# Patient Record
Sex: FEMALE
[De-identification: names, ages, dates, MRNs, and addresses within clinical notes are randomized per-mention and may not be internally consistent; named-entity substitution may affect disease eponyms.]

---

## 2019-10-17 ENCOUNTER — APPOINTMENT (OUTPATIENT)
Dept: OTOLARYNGOLOGY | Facility: CLINIC | Age: 39
End: 2019-10-17
Payer: COMMERCIAL

## 2019-10-17 VITALS
HEART RATE: 101 BPM | HEIGHT: 60 IN | DIASTOLIC BLOOD PRESSURE: 95 MMHG | SYSTOLIC BLOOD PRESSURE: 127 MMHG | WEIGHT: 145 LBS | BODY MASS INDEX: 28.47 KG/M2

## 2019-10-17 DIAGNOSIS — Z78.9 OTHER SPECIFIED HEALTH STATUS: ICD-10-CM

## 2019-10-17 DIAGNOSIS — Z87.891 PERSONAL HISTORY OF NICOTINE DEPENDENCE: ICD-10-CM

## 2019-10-17 PROBLEM — Z00.00 ENCOUNTER FOR PREVENTIVE HEALTH EXAMINATION: Status: ACTIVE | Noted: 2019-10-17

## 2019-10-17 PROCEDURE — 31575 DIAGNOSTIC LARYNGOSCOPY: CPT

## 2019-10-17 PROCEDURE — 99203 OFFICE O/P NEW LOW 30 MIN: CPT | Mod: 25

## 2019-10-17 RX ORDER — VALACYCLOVIR 500 MG/1
500 TABLET, FILM COATED ORAL
Qty: 30 | Refills: 0 | Status: ACTIVE | COMMUNITY
Start: 2019-09-19

## 2019-10-17 RX ORDER — CHORIOGONADOTROPIN ALFA 250 UG/.5ML
250 INJECTION, SOLUTION SUBCUTANEOUS
Qty: 1 | Refills: 0 | Status: ACTIVE | COMMUNITY
Start: 2019-08-21

## 2019-10-17 RX ORDER — CLOMIPHENE CITRATE 50 MG/1
50 TABLET ORAL
Qty: 10 | Refills: 0 | Status: ACTIVE | COMMUNITY
Start: 2019-10-07

## 2019-10-17 RX ORDER — DOXYCYCLINE HYCLATE 100 MG/1
100 TABLET ORAL
Qty: 14 | Refills: 0 | Status: COMPLETED | COMMUNITY
Start: 2019-07-18

## 2019-10-17 RX ORDER — TOBRAMYCIN AND DEXAMETHASONE 3; 1 MG/ML; MG/ML
0.3-0.1 SUSPENSION/ DROPS OPHTHALMIC
Qty: 10 | Refills: 0 | Status: COMPLETED | COMMUNITY
Start: 2019-06-19

## 2019-10-17 NOTE — REASON FOR VISIT
[Neck Mass] : neck mass [Initial Evaluation] : an initial evaluation for [Salivary Gland Problem] : salivary gland problem

## 2019-10-17 NOTE — ASSESSMENT
[FreeTextEntry1] : She has a neck mass which is due to acute left submandibular gland sialadenitis. There was no cellulitis. I was able to express some purulent drainage from the duct on palpation.  There is no floor of mouth swelling  and her airway is patent\par \par Plan\par -Findings and management options were discussed with the patient.\par -Warm compresses\par -Hydration and sialagogues\par -She was asked to massage the gland\par -I am placing her on a course of Augmentin. She will check with her obstetrician to ensure it is okay with her IVF treatment\par -I am sending her for a CT scan without contrast to rule out a submandibular gland stone or lesion. She will also speak with her obstetrician to ensure it is okay\par -I am going to see her back next week. I told her to call me and/or go to the emergency room if she has any worsening symptoms or concerns

## 2019-10-17 NOTE — HISTORY OF PRESENT ILLNESS
[de-identified] : OLVIN SAUER is a 39 year patient With a swollen gland in the left neck since Monday. It gets more swollen after she eats. She was not sick when it started. She had something similar about 1-2 months ago which resolved. She had had other episodes. At least one improved with antibiotics. She has no dysphagia, throat pain, voice change, or bleeding. She has no night sweats, fevers, or unexpected weight loss. There is no family history or personal history of leukemia or lymphoma. She is undergoing an IVF treatment. She was last seen here in 2008.

## 2019-10-21 ENCOUNTER — APPOINTMENT (OUTPATIENT)
Dept: OTOLARYNGOLOGY | Facility: CLINIC | Age: 39
End: 2019-10-21
Payer: COMMERCIAL

## 2019-10-21 PROCEDURE — 99213 OFFICE O/P EST LOW 20 MIN: CPT

## 2019-10-21 NOTE — ASSESSMENT
[FreeTextEntry1] : She has left submandibular gland sialoadenitis.  She initially had improvement on augmentin but still has inflammation of the gland. There was no cellulitis.  There is drainage from the submandibular gland duct. \par \par Plan\par -Findings and management options were discussed with the patient.\par -continue warm compresses, hydration and sialagogues\par -continue to massage the gland\par -I would like to switch her to clindamycin and add a burst of prednisone.  I reviewed some of the risks of prednisone which include GI complications, mood changes and bone issues/fractures. She is going to speak to her fertility doctor to see if the medications are okay for her to take. \par -CT scan is pending. I will see if it can be done today. \par -she is going to call me after she speaks with her doctor.  My office will also call her regarding the CT scan. She will keep her appointment for Thursday.  She will return earlier if any concerns. She should call me and possible go to the ER if any problems.

## 2019-10-21 NOTE — HISTORY OF PRESENT ILLNESS
[de-identified] : OLVIN SAUER is a 39 year patient With a swollen gland in the left neck since Monday. It gets more swollen after she eats. She was not sick when it started. She had something similar about 1-2 months ago which resolved. She had had other episodes. At least one improved with antibiotics. She has no dysphagia, throat pain, voice change, or bleeding. She has no night sweats, fevers, or unexpected weight loss. There is no family history or personal history of leukemia or lymphoma. She is undergoing an IVF treatment. She was last seen here in 2008. [FreeTextEntry1] : \par 10/21/19- Brisa Sinha is here for follow up for left submandibular gland sialoadenitis.  She has been on Augmentin since Thursday.  She initially thought she was improving but now has not. She has pain and swelling. She has been massaging the gland. there is milky drainage when she does.  She has also tried sialogogues and hydration.  CT scan is pending.

## 2019-10-23 ENCOUNTER — APPOINTMENT (OUTPATIENT)
Dept: CT IMAGING | Facility: CLINIC | Age: 39
End: 2019-10-23

## 2019-10-24 ENCOUNTER — APPOINTMENT (OUTPATIENT)
Dept: OTOLARYNGOLOGY | Facility: CLINIC | Age: 39
End: 2019-10-24
Payer: COMMERCIAL

## 2019-10-24 PROCEDURE — 99213 OFFICE O/P EST LOW 20 MIN: CPT

## 2019-10-24 NOTE — HISTORY OF PRESENT ILLNESS
[de-identified] : I am seeing this woman followup at the request of Dr. sands.\par She's been treating her for a submandibular gland phlegmon/cellulitis.\par She's finally responding to treatment.\par \par She reports that for the last 2 years she is intermittently had very low-grade symptoms related to her left submandibular gland. She reports a day or so low grade they have never required treatment her workup.\par \par From this acute infection she feels almost back to normal.\par \par She imaging which I recently reviewed. This shows an approximately 1 cm stone at the junction of the smg hilum and the duct. She had significant induration of the gland.

## 2019-10-24 NOTE — ASSESSMENT
[FreeTextEntry1] : She has a resolving left-sided acute submandibular gland infection. She will complete her current treatment.\par \par A review of her CT scan with her.\par \par I explained her secondary to the size of the stone in the proximal location that her options were observation versus submandibular gland resection.\par We talked about the risks which include but are not limited to risks of anesthesia, bleeding, infection or transient or permanent injury to her marginal mandibular nerve. I demonstrated what marginal mandibular nerve injury looks like I picture.\par \par She will followup with me in a month.\par I will palpate her floor mouth when there is no inflammation and see if a stone can be palpated.\par I did explain her to could she could consider intraoral resection with marsupialization of the duct.\par I did tell her that still gave her the possibility of stenosis of her duct and a ventral infection. I also explained her the lingual nerve could be at risk in that setting to

## 2019-10-24 NOTE — REASON FOR VISIT
[Subsequent Evaluation] : a subsequent evaluation for [FreeTextEntry2] : submandibular gland infection.

## 2019-11-25 ENCOUNTER — APPOINTMENT (OUTPATIENT)
Dept: OTOLARYNGOLOGY | Facility: CLINIC | Age: 39
End: 2019-11-25

## 2021-06-02 ENCOUNTER — APPOINTMENT (OUTPATIENT)
Dept: OTOLARYNGOLOGY | Facility: CLINIC | Age: 41
End: 2021-06-02
Payer: COMMERCIAL

## 2021-06-02 VITALS — BODY MASS INDEX: 28.47 KG/M2 | TEMPERATURE: 98.6 F | WEIGHT: 145 LBS | HEIGHT: 60 IN

## 2021-06-02 DIAGNOSIS — R22.1 LOCALIZED SWELLING, MASS AND LUMP, NECK: ICD-10-CM

## 2021-06-02 PROCEDURE — 99214 OFFICE O/P EST MOD 30 MIN: CPT | Mod: 25

## 2021-06-02 PROCEDURE — 31575 DIAGNOSTIC LARYNGOSCOPY: CPT

## 2021-06-02 PROCEDURE — 99072 ADDL SUPL MATRL&STAF TM PHE: CPT

## 2021-06-02 RX ORDER — CLINDAMYCIN HYDROCHLORIDE 300 MG/1
300 CAPSULE ORAL 3 TIMES DAILY
Qty: 42 | Refills: 0 | Status: DISCONTINUED | COMMUNITY
Start: 2019-10-21 | End: 2021-06-02

## 2021-06-02 RX ORDER — AMOXICILLIN AND CLAVULANATE POTASSIUM 875; 125 MG/1; MG/1
875-125 TABLET, COATED ORAL
Qty: 14 | Refills: 0 | Status: DISCONTINUED | COMMUNITY
Start: 2019-10-17 | End: 2021-06-02

## 2021-06-02 RX ORDER — PREDNISONE 10 MG/1
10 TABLET ORAL
Qty: 12 | Refills: 0 | Status: DISCONTINUED | COMMUNITY
Start: 2019-10-21 | End: 2021-06-02

## 2021-06-02 NOTE — ASSESSMENT
[FreeTextEntry1] : Acute Left sialadenitis due to sialithiasis.  Recommended with nausea, head of bed elevation, sialagogues, warm compresses. We will prescribed Augmentin, but if needed she tells me her OB improved clindamycin. We will try to avoid steroids, but she will followup in 2 days before the weekend to be sure she is still improving and if not need to discuss use of steroids with her OB/GYN. She understands she should followup sooner if she has any worsening of her symptoms or airway compromise. None was visible on endoscopy today

## 2021-06-02 NOTE — HISTORY OF PRESENT ILLNESS
[de-identified] : Patient with history of left sialadenitis October 2019, originally did not respond to Augmentin, switched to clindamycin and prednisone and resolved. CT scan at that time and showed 1 cm stone proximal and intact. She was offered in office marsupialization and removal of stone versus sialadenectomy by Dr Matias.  She deferred, and was well since then with no flareups. Last night noted the gland "filled up" after eating. She was unable to decompress it in the usual manner of liquid. She is currently in her second trimester of pregnancy. Tells me her OB/GYN approved use of clindamycin

## 2021-06-04 ENCOUNTER — APPOINTMENT (OUTPATIENT)
Dept: OTOLARYNGOLOGY | Facility: CLINIC | Age: 41
End: 2021-06-04
Payer: COMMERCIAL

## 2021-06-04 PROCEDURE — 99072 ADDL SUPL MATRL&STAF TM PHE: CPT

## 2021-06-04 PROCEDURE — 99212 OFFICE O/P EST SF 10 MIN: CPT

## 2021-06-04 NOTE — HISTORY OF PRESENT ILLNESS
[de-identified] : OLVIN SAUER is a 41 year patient here for followup for left sialoadenitis. She saw Dr. Ken on June 2 for a recurrent infection. She has been on Augmentin. She said the swelling has gotten a little bit better. She still has discomfort. She has no difficulty breathing. She has a history of a left submandibular gland sialolithiasis which was diagnosed in October 2019. She opted for observation. She had done well until this recent infection.  she is currently in the second trimester of pregnancy

## 2021-06-04 NOTE — ASSESSMENT
[FreeTextEntry1] : She has acute left sialoadenitis. She has a history of sialolithiasis on that side. She has been on Augmentin. She is slowly improving. I was able to express purulent drainage from the gland\par \par  \par -Plan\par -Findings and management options were discussed with the patient.\par -hydration\par -massage of the gland\par -warm compresses/heating pad as needed. \par -She will continue to take Augmentin. If she does not continue to improve or her symptoms worsen, we will switch her to clindamycin. We would also consider adding steroids\par -If she is unable to keep up her nutrition, I told her to speak with her OB/GYN. She may need hospitalization for intravenous.\par -She was asked to follow up with Dr. Ken or myself next week.\par -She was told to call and go to the emergency room if she has any worsening symptoms over the weekend..

## 2021-06-17 ENCOUNTER — APPOINTMENT (OUTPATIENT)
Dept: OTOLARYNGOLOGY | Facility: CLINIC | Age: 41
End: 2021-06-17
Payer: COMMERCIAL

## 2021-06-17 DIAGNOSIS — K11.21 ACUTE SIALOADENITIS: ICD-10-CM

## 2021-06-17 DIAGNOSIS — K11.5 SIALOLITHIASIS: ICD-10-CM

## 2021-06-17 PROCEDURE — 99214 OFFICE O/P EST MOD 30 MIN: CPT

## 2021-06-17 PROCEDURE — 99072 ADDL SUPL MATRL&STAF TM PHE: CPT

## 2021-06-17 RX ORDER — AMOXICILLIN AND CLAVULANATE POTASSIUM 875; 125 MG/1; MG/1
875-125 TABLET, COATED ORAL
Qty: 20 | Refills: 0 | Status: COMPLETED | COMMUNITY
Start: 2021-06-02 | End: 2021-06-17

## 2021-06-17 RX ORDER — PREDNISONE 10 MG/1
10 TABLET ORAL
Qty: 12 | Refills: 0 | Status: ACTIVE | COMMUNITY
Start: 2021-06-17 | End: 1900-01-01

## 2021-06-17 RX ORDER — CLINDAMYCIN HYDROCHLORIDE 300 MG/1
300 CAPSULE ORAL
Qty: 30 | Refills: 0 | Status: ACTIVE | COMMUNITY
Start: 2021-06-17 | End: 1900-01-01

## 2021-06-17 NOTE — HISTORY OF PRESENT ILLNESS
[de-identified] : OLVIN SAUER is a 41 year patient Here for followup for acute left sialoadenitis. She has been on Augmentin. She still has swelling and tenderness in the area. She has no difficulty eating or breathing. She had been in the second trimester of pregnancy but unfortunately lost the pregnancy.\par \par ENT history\par She has a history of left submandibular gland sialolithiasis diagnosed in October 2019.

## 2021-06-17 NOTE — ASSESSMENT
[FreeTextEntry1] : She has acute left sialoadenitis. She has had significant improvement but still has some mild swelling and tenderness of the gland.\par \par  \par -Plan\par -Findings and management options were discussed with the patient.\par -hydration\par -massage of the gland\par -warm compresses/heating pad as needed. \par -I will switch her to clindamycin which she has felt has worked better in the past. I will also add a short burst of prednisone. She has taken the medication in the past. I asked her reviewed the side effects. She should take both medications with food.\par -I will her see Dr. Matias in one month to discuss possible surgery. She should call and return urgently if she has any worsening symptoms.

## 2021-07-15 ENCOUNTER — APPOINTMENT (OUTPATIENT)
Dept: OTOLARYNGOLOGY | Facility: CLINIC | Age: 41
End: 2021-07-15

## 2021-07-23 ENCOUNTER — APPOINTMENT (OUTPATIENT)
Dept: OTOLARYNGOLOGY | Facility: CLINIC | Age: 41
End: 2021-07-23

## 2023-07-24 NOTE — REVIEW OF SYSTEMS
NOTIFICATION OF ADMISSION DISCHARGE   This is a Notification of Discharge from [facility]. Please be advised that this patient has been discharge from our facility. Below you will find the admission and discharge date and time including the patient’s disposition. UTILIZATION REVIEW CONTACT:  Umair Caputo MA  Utilization   Network Utilization Review Department  Phone: [phone] x carefully listen to the prompts. All voicemails are confidential.  Email: [email or web address]     ADMISSION INFORMATION  PRESENTATION DATE: 7/19/2023  5:59 PM  OBERVATION ADMISSION DATE:   INPATIENT ADMISSION DATE: 7/19/23  7:01 PM   DISCHARGE DATE: 7/22/2023 11:09 AM   DISPOSITION:Home/Self Care    IMPORTANT INFORMATION:  Send all requests for admission clinical reviews, approved or denied determinations and any other requests to dedicated fax number below belonging to the campus where the patient is receiving treatment.  List of dedicated fax numbers:  [place] DENIALS (Administrative/Medical Necessity) [phone] [address] [facility] (Maternity/NICU/Pediatrics) [phone]   [facility] [phone]   [place] [phone] [address] [facility] [phone]   [address] [place] [address] [phone]   [facility] [phone]   [address] [place] [address] [phone]   [address] [place] [address] [phone]   [address] [facility] [phone] [address] [facility] [address] [phone] [Patient Intake Form Reviewed] : Patient intake form was reviewed